# Patient Record
Sex: MALE | Race: BLACK OR AFRICAN AMERICAN | NOT HISPANIC OR LATINO | Employment: UNEMPLOYED | URBAN - METROPOLITAN AREA
[De-identification: names, ages, dates, MRNs, and addresses within clinical notes are randomized per-mention and may not be internally consistent; named-entity substitution may affect disease eponyms.]

---

## 2023-04-27 ENCOUNTER — HOSPITAL ENCOUNTER (OUTPATIENT)
Dept: DATA CONVERSION | Facility: HOSPITAL | Age: 18
End: 2023-04-27
Attending: STUDENT IN AN ORGANIZED HEALTH CARE EDUCATION/TRAINING PROGRAM | Admitting: STUDENT IN AN ORGANIZED HEALTH CARE EDUCATION/TRAINING PROGRAM
Payer: COMMERCIAL

## 2023-04-27 DIAGNOSIS — M92.8 OTHER SPECIFIED JUVENILE OSTEOCHONDROSIS: ICD-10-CM

## 2023-04-27 DIAGNOSIS — S86.819A STRAIN OF OTHER MUSCLE(S) AND TENDON(S) AT LOWER LEG LEVEL, UNSPECIFIED LEG, INITIAL ENCOUNTER: ICD-10-CM

## 2023-04-27 DIAGNOSIS — M92.522 JUVENILE OSTEOCHONDROSIS OF TIBIA TUBERCLE, LEFT LEG: ICD-10-CM

## 2023-04-27 DIAGNOSIS — M76.52 PATELLAR TENDINITIS, LEFT KNEE: ICD-10-CM

## 2023-09-07 VITALS
HEART RATE: 65 BPM | SYSTOLIC BLOOD PRESSURE: 130 MMHG | RESPIRATION RATE: 16 BRPM | DIASTOLIC BLOOD PRESSURE: 86 MMHG | TEMPERATURE: 97.9 F

## 2023-09-14 NOTE — H&P
History of Present Illness:   History Present Illness:  Reason for surgery: patella tendon tear   HPI:    Rayo presents for elective left knee patella tendon debridement and repair. No changes since last visit    Allergies:        Allergies:  ·  No Known Allergies :     Home Medication Review:   Home Medications Reviewed: yes     Impression/Procedure:   ·  Impression and Planned Procedure: PT debridement/repair       ERAS (Enhanced Recovery After Surgery):  ·  ERAS Patient: no       Vital Signs:  Temperature C: 36.6 degrees C   Temperature F: 97.8 degrees F   Heart Rate: 65 beats per minute   Respiratory Rate: 16 breath per minute   Blood Pressure Systolic: 130 mm/Hg   Blood Pressure Diastolic: 86 mm/Hg     Physical Exam by System:    Constitutional: Well developed, awake/alert/oriented  x3, no distress, alert and cooperative   Respiratory/Thorax: Patent airways, CTAB, normal  breath sounds with good chest expansion, thorax symmetric   Cardiovascular: Regular, rate and rhythm, no murmurs,  2+ equal pulses of the extremities, normal S 1and S 2   Musculoskeletal: left knee: ttp over tibia tubercle,  + bump, good SLR     Consent:   COVID-19 Consent:  ·  COVID-19 Risk Consent Surgeon has reviewed key risks related to the risk of amandeep COVID-19 and if they contract COVID-19 what the risks are.       Electronic Signatures:  Anand Aelxis)  (Signed 27-Apr-2023 08:12)   Authored: History of Present Illness, Allergies, Home  Medication Review, Impression/Procedure, ERAS, Physical Exam, Consent, Note Completion      Last Updated: 27-Apr-2023 08:12 by Anand Alexis)

## 2023-10-02 NOTE — OP NOTE
Post Operative Note:     PreOp Diagnosis: left knee patella tendinosis, osgood  schlatter disease   Post-Procedure Diagnosis: same   Procedure: left knee patella tendon debridement and  repair   Surgeon: Reuben   Resident/Fellow/Other Assistant: none   Anesthesia: glma + regional   Estimated Blood Loss (mL): none   Specimen: no   Complications: none   Findings: see dictation   Patient Returned To/Condition: pacu/stable   Tourniquet Times: 34 minutes     Attestation:   Note Completion:  Attending Attestation I was present for the entire procedure         Electronic Signatures:  Anand Alexis)  (Signed 27-Apr-2023 09:16)   Authored: Post Operative Note, Note Completion      Last Updated: 27-Apr-2023 09:16 by Anand Alexis)

## 2023-11-10 ENCOUNTER — HOSPITAL ENCOUNTER (EMERGENCY)
Facility: HOSPITAL | Age: 18
Discharge: HOME | End: 2023-11-10
Attending: EMERGENCY MEDICINE
Payer: COMMERCIAL

## 2023-11-10 ENCOUNTER — APPOINTMENT (OUTPATIENT)
Dept: RADIOLOGY | Facility: HOSPITAL | Age: 18
End: 2023-11-10
Payer: COMMERCIAL

## 2023-11-10 VITALS
RESPIRATION RATE: 18 BRPM | DIASTOLIC BLOOD PRESSURE: 83 MMHG | SYSTOLIC BLOOD PRESSURE: 125 MMHG | BODY MASS INDEX: 23.49 KG/M2 | HEIGHT: 68 IN | TEMPERATURE: 97.9 F | HEART RATE: 71 BPM | WEIGHT: 155 LBS | OXYGEN SATURATION: 100 %

## 2023-11-10 DIAGNOSIS — S46.911A STRAIN OF RIGHT SHOULDER, INITIAL ENCOUNTER: Primary | ICD-10-CM

## 2023-11-10 PROCEDURE — 99283 EMERGENCY DEPT VISIT LOW MDM: CPT | Mod: 25

## 2023-11-10 PROCEDURE — 73030 X-RAY EXAM OF SHOULDER: CPT | Mod: RT

## 2023-11-10 PROCEDURE — 99285 EMERGENCY DEPT VISIT HI MDM: CPT | Mod: 25 | Performed by: EMERGENCY MEDICINE

## 2023-11-10 PROCEDURE — 94760 N-INVAS EAR/PLS OXIMETRY 1: CPT

## 2023-11-10 ASSESSMENT — PAIN DESCRIPTION - LOCATION: LOCATION: SHOULDER

## 2023-11-10 ASSESSMENT — PAIN DESCRIPTION - FREQUENCY: FREQUENCY: CONSTANT/CONTINUOUS

## 2023-11-10 ASSESSMENT — PAIN SCALES - GENERAL: PAINLEVEL_OUTOF10: 5 - MODERATE PAIN

## 2023-11-10 ASSESSMENT — COLUMBIA-SUICIDE SEVERITY RATING SCALE - C-SSRS
6. HAVE YOU EVER DONE ANYTHING, STARTED TO DO ANYTHING, OR PREPARED TO DO ANYTHING TO END YOUR LIFE?: NO
1. IN THE PAST MONTH, HAVE YOU WISHED YOU WERE DEAD OR WISHED YOU COULD GO TO SLEEP AND NOT WAKE UP?: NO
2. HAVE YOU ACTUALLY HAD ANY THOUGHTS OF KILLING YOURSELF?: NO

## 2023-11-10 ASSESSMENT — PAIN DESCRIPTION - PAIN TYPE: TYPE: ACUTE PAIN

## 2023-11-10 ASSESSMENT — PAIN DESCRIPTION - PROGRESSION: CLINICAL_PROGRESSION: NOT CHANGED

## 2023-11-10 ASSESSMENT — PAIN DESCRIPTION - DESCRIPTORS: DESCRIPTORS: ACHING;PRESSURE

## 2023-11-10 ASSESSMENT — PAIN DESCRIPTION - ORIENTATION: ORIENTATION: RIGHT

## 2023-11-10 ASSESSMENT — PAIN - FUNCTIONAL ASSESSMENT: PAIN_FUNCTIONAL_ASSESSMENT: 0-10

## 2023-11-10 ASSESSMENT — PAIN DESCRIPTION - ONSET: ONSET: GRADUAL
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
## 2023-11-10 NOTE — Clinical Note
Rayo Espino was seen and treated in our emergency department on 11/10/2023.  He may return to work on 11/13/2023.  ?     If you have any questions or concerns, please don't hesitate to call.      Kelvin Nelson MD

## 2024-01-17 ENCOUNTER — OFFICE VISIT (OUTPATIENT)
Dept: ORTHOPEDIC SURGERY | Facility: HOSPITAL | Age: 19
End: 2024-01-17
Payer: COMMERCIAL

## 2024-01-17 DIAGNOSIS — M25.311 SHOULDER INSTABILITY, RIGHT: Primary | ICD-10-CM

## 2024-01-17 PROCEDURE — 99213 OFFICE O/P EST LOW 20 MIN: CPT | Performed by: ORTHOPAEDIC SURGERY

## 2024-01-17 NOTE — PROGRESS NOTES
Patient is here for reevaluation of his shoulder he is approximately 1 year status post anterior stabilization with Bankart procedure he has been doing generally well until about 2 months ago started to have some recurrent apprehension symptoms.  He does not feel that he can block the ball when playing basketball and certain motions at work are bothering his shoulder.  He does not have specific pain.    The patient is pleasant and cooperative.  The patient is alert and oriented ×3.  Auditory function is intact.  The patient is a good historian.  The patient is not in acute distress.  Eye exam significant for nonicteric sclera, intact ocular muscle movement.  Breathing is rhythmic symmetric and nonlabored.  Radial pulse palpable brisk capillary refill active sensation intact well-healed arthroscopy scars of the anterior and posterior shoulder.  Patient is full range of motion of the shoulder elevation 180+ external rotation abduction 100 degrees with no apprehension.  Motor power abduction internal/external rotation  strength 5/5.    Right shoulder instability    Patient has subtle instability symptoms I recommended revisiting formal physical therapy and repeat examination in 1 month.  Physical therapy prescription was provided today.    This was dictated using voice recognition software and not corrected for grammatical or spelling errors.

## 2024-01-23 ENCOUNTER — EVALUATION (OUTPATIENT)
Dept: PHYSICAL THERAPY | Facility: CLINIC | Age: 19
End: 2024-01-23
Payer: COMMERCIAL

## 2024-01-23 DIAGNOSIS — M25.511 RIGHT SHOULDER PAIN: Primary | ICD-10-CM

## 2024-01-23 DIAGNOSIS — M25.311 SHOULDER INSTABILITY, RIGHT: ICD-10-CM

## 2024-01-23 PROCEDURE — 97161 PT EVAL LOW COMPLEX 20 MIN: CPT | Mod: GP | Performed by: PHYSICAL THERAPIST

## 2024-01-23 ASSESSMENT — ENCOUNTER SYMPTOMS
OCCASIONAL FEELINGS OF UNSTEADINESS: 0
LOSS OF SENSATION IN FEET: 0
DEPRESSION: 0
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
## 2024-01-29 ENCOUNTER — TREATMENT (OUTPATIENT)
Dept: PHYSICAL THERAPY | Facility: CLINIC | Age: 19
End: 2024-01-29
Payer: COMMERCIAL

## 2024-01-29 DIAGNOSIS — M25.511 RIGHT SHOULDER PAIN: ICD-10-CM

## 2024-01-29 PROCEDURE — 97110 THERAPEUTIC EXERCISES: CPT | Mod: GP | Performed by: PHYSICAL THERAPIST

## 2024-01-29 NOTE — PROGRESS NOTES
Physical Therapy    Physical Therapy Treatment    Patient Name: Rayo Espino  MRN: [id number]  Today's Date: 1/29/2024    Time Calculation  Start Time: 1144  Stop Time: 1227  Time Calculation (min): 43 min    Visit #: 2 out of 13 (Eval + 12 visits authorized 1/24/24-3/22/24)  Evaluation date: 1/23/24    Current Problem:   1. Right shoulder pain  Follow Up In Physical Therapy          SUBJECTIVE:   Both shoulders feel sore from working out, but no significant change in symptoms in the R shoulder. Reports compliance with his HEP. Officially started job working night shift at [employer] without concern for physical requirements of job demands.      Precautions:  Past surgical considerations:   R shoulder anterior stabilization with Bankart repair (Dr. Hardin 7/2022)   L patellar tendon debridement and repair (Dr. Alexis 4/2023)        Pain:   Start of session: denies pain at rest, general soreness in B shoulders       OBJECTIVE:    Treatments:  Therapeutic Exercise: (43 minutes)  Seated UBE x2 min forward, x2 min backward  Unilateral mid cable row in staggered stance R UE 48# x15, 60# x15  Seated unilateral high cable row R UE48# x15, 60    Wall clocks with orange TB around wrists - horizontal ABD and scaption 2x10 cycles ea UE  Foam roll up wall in scapular protraction with orange TB around wrists 2x10  Standing R shoulder row at 90 ABD + ER isometric turns (orange TB) x10  Standing R shoulder ER (orange TB) + scaption raise x10  Prone B shoulder scapular retraction + extension x10  Prone R shoulder horizontal ABD x10    HEP:  Access Code: [email or web address]  URL: [email or web address]  Date: 01/23/2024  Prepared by: Carolina Monterroso     Exercises  - Seated Thoracic Lumbar Extension with Pectoralis Stretch  - 1 x daily - 7 x weekly - 10 reps  - Prone Shoulder Extension - Single Arm with Dumbbell  - 1 x daily - 7 x weekly - 2-3 sets - 10-15 reps  - Prone Single Arm Shoulder Horizontal Abduction with Dumbbell  - 1 x daily -  7 x weekly - 2-3 sets - 10-15 reps  - Prone Shoulder Row with External Rotation with Dumbbell  - 1 x daily - 7 x weekly - 2-3 sets - 10-15 reps  - Bent Over Single Arm Shoulder Row with Dumbbell  - 1 x daily - 7 x weekly - 2-3 sets - 10-15 reps    ASSESSMENT:   Pt participated in therapeutic exercise this date with resultant muscular fatigue of scapular stabilizers without any provocation of pain. Demonstrated appropriate performance of HEP interventions without need for technique correction.     Post session pain: muscular fatigue, denies pain     PLAN:  Continue with POC established at evaluation as below:    OP PT PLAN:  Treatment/Interventions: Blood Flow Restriction Therapy  , Dry Needling  , Education/Instruction , Neuromuscular re-education , Self care/home management , Taping techniques , Therapeutic activities , and Therapeutic exercise    PT Plan: Skilled PT   PT Frequency: 2 times per week   Duration: 6 weeks  Certification Period Start Date: 1/24/24  Certification Period End Date: 3/22/24  Visits Approved: Eval + 12  CPT codes approved: 65076, 24615, 47290, 62354   Rehab Potential: Good  Plan of Care Agreement: Patient         Goals:   SHORT TERM GOALS   1) Pt will decrease pain from 0-7/10 to 0-3/10 for improved activity tolerance -PROGRESSING  2) Pt will demonstrate improved postural awareness demonstrating neutral spinal and shoulder posture in sitting without need for cues -PROGRESSING  2) Pt will present with (-) R shoulder apprehension testing reflect improved anterior stability and reduced risk for subluxation -PROGRESSING     LTG  1) Pt will demonstrate independence with HEP for self management of condition at discharge -PROGRESSING  2) Pt will improve R shoulder elevation AROM to painfree and full (180 ABD, 180 FLEX) facilitate ADL performance without limitation -PROGRESSING  3) Pt will improve gross R shoulder/scapular strength to 5/5 with painfree MMT to facilitate ability to lift/carry  without limitation as required for job -PROGRESSING  4) Pt will improve total SPADI score from 39/130 to <5/130 to reflect improved UE function -PROGRESSING

## 2024-02-02 ENCOUNTER — TREATMENT (OUTPATIENT)
Dept: PHYSICAL THERAPY | Facility: CLINIC | Age: 19
End: 2024-02-02
Payer: COMMERCIAL

## 2024-02-02 DIAGNOSIS — M25.511 RIGHT SHOULDER PAIN: ICD-10-CM

## 2024-02-02 PROCEDURE — 97110 THERAPEUTIC EXERCISES: CPT | Mod: GP

## 2024-02-02 NOTE — PROGRESS NOTES
Physical Therapy    Physical Therapy Treatment    Patient Name: Rayo Espino  MRN: [id number]  Today's Date: 2/2/2024    Time Calculation  Start Time: 1108  Stop Time: 1148  Time Calculation (min): 40 min    Visit #: 3 out of 13 (Eval + 12 visits authorized 1/24/24-3/22/24)  Evaluation date: 1/23/24    Current Problem:   1. Right shoulder pain  Follow Up In Physical Therapy          SUBJECTIVE:   Patient arrived late to PT session. Patient reports that he just came off of a 8 hours night shift, denies pain but reports it was being used a lot. Can feel a click when lifting something heavy.      Precautions:  Past surgical considerations:   R shoulder anterior stabilization with Bankart repair (Dr. Hardin 7/2022)   L patellar tendon debridement and repair (Dr. Alexis 4/2023)        Pain:   Start of session: 3/10       OBJECTIVE:    Treatments:  Therapeutic Exercise: (40 minutes)  Seated UBE x2 min forward, x2 min backward  Prone B shoulder scapular retraction + extension 2x10  Prone R shoulder horizontal ABD 2x10   Prone R shoulder scaption 2x10   R UE cable row 60# 2x10 in staggered stance 2x10  R shoulder ball stabilization circles at wall CW/CCW 2x20   DA shoulder lift off at wall 2x10    HEP:  Access Code: [email or web address]  URL: [email or web address]  Date: 01/23/2024  Prepared by: Carolina Monterroso     Exercises  - Seated Thoracic Lumbar Extension with Pectoralis Stretch  - 1 x daily - 7 x weekly - 10 reps  - Prone Shoulder Extension - Single Arm with Dumbbell  - 1 x daily - 7 x weekly - 2-3 sets - 10-15 reps  - Prone Single Arm Shoulder Horizontal Abduction with Dumbbell  - 1 x daily - 7 x weekly - 2-3 sets - 10-15 reps  - Prone Shoulder Row with External Rotation with Dumbbell  - 1 x daily - 7 x weekly - 2-3 sets - 10-15 reps  - Bent Over Single Arm Shoulder Row with Dumbbell  - 1 x daily - 7 x weekly - 2-3 sets - 10-15 reps    ASSESSMENT:   Patient tolerated scapular strengthening/stability interventions to  improve function and promote progression towards goals. Patient did not report increase in pain with interventions at this date.     Post session pain: muscular fatigue, denies pain     PLAN:  Continue with POC established at evaluation as below:  Cvnv    OP PT PLAN:  Treatment/Interventions: Blood Flow Restriction Therapy  , Dry Needling  , Education/Instruction , Neuromuscular re-education , Self care/home management , Taping techniques , Therapeutic activities , and Therapeutic exercise    PT Plan: Skilled PT   PT Frequency: 2 times per week   Duration: 6 weeks  Certification Period Start Date: 1/24/24  Certification Period End Date: 3/22/24  Visits Approved: Eval + 12  CPT codes approved: 33036, 50198, 66525, 47954   Rehab Potential: Good  Plan of Care Agreement: Patient         Goals:   SHORT TERM GOALS   1) Pt will decrease pain from 0-7/10 to 0-3/10 for improved activity tolerance -PROGRESSING  2) Pt will demonstrate improved postural awareness demonstrating neutral spinal and shoulder posture in sitting without need for cues -PROGRESSING  2) Pt will present with (-) R shoulder apprehension testing reflect improved anterior stability and reduced risk for subluxation -PROGRESSING     LTG  1) Pt will demonstrate independence with HEP for self management of condition at discharge -PROGRESSING  2) Pt will improve R shoulder elevation AROM to painfree and full (180 ABD, 180 FLEX) facilitate ADL performance without limitation -PROGRESSING  3) Pt will improve gross R shoulder/scapular strength to 5/5 with painfree MMT to facilitate ability to lift/carry without limitation as required for job -PROGRESSING  4) Pt will improve total SPADI score from 39/130 to <5/130 to reflect improved UE function -PROGRESSING

## 2024-02-08 ENCOUNTER — TREATMENT (OUTPATIENT)
Dept: PHYSICAL THERAPY | Facility: CLINIC | Age: 19
End: 2024-02-08
Payer: COMMERCIAL

## 2024-02-08 DIAGNOSIS — M25.511 RIGHT SHOULDER PAIN: ICD-10-CM

## 2024-02-08 PROCEDURE — 97110 THERAPEUTIC EXERCISES: CPT | Mod: GP,CQ

## 2024-02-08 NOTE — PROGRESS NOTES
Physical Therapy    Physical Therapy Treatment    Patient Name: Rayo Espino  MRN: [id number]  Today's Date: 2/8/2024    Time Calculation  Start Time: 0937  Stop Time: 1018  Time Calculation (min): 41 min    Visit #: 4 out of 13 (Eval + 12 visits authorized 1/24/24-3/22/24)  Evaluation date: 1/23/24    Current Problem:   1. Right shoulder pain  Follow Up In Physical Therapy          SUBJECTIVE:   Patient arrived late to PT session. Notes to have had another overnight shift.  He detects some lingering sense of instability with overhead function while at work, though he isn't necessarily avoiding anything.     Precautions:  Past surgical considerations:   R shoulder anterior stabilization with Bankart repair (Dr. Hardin 7/2022)   L patellar tendon debridement and repair (Dr. Alexis 4/2023)        Pain:   Start of session: 0/10       OBJECTIVE:    Treatments:  Therapeutic Exercise: (40 minutes)  Stand and seated row 72# x15 ea  Backhand ball roll up wall 2x15  Prone -DA ball raise 2x15  S/L : ER 2# 2x12  -horz abd 2# 2x15  -sagittal flex/ext 2# 2x12  Supine SA protract 11# 2x10  -DA mini horz abd DA 2x15        HEP:  Added- S/L ER (2/8/24)    Access Code: [email or web address]  URL: [email or web address]  Date: 01/23/2024  Prepared by: Carolina Monterroso     Exercises  - Seated Thoracic Lumbar Extension with Pectoralis Stretch  - 1 x daily - 7 x weekly - 10 reps  - Prone Shoulder Extension - Single Arm with Dumbbell  - 1 x daily - 7 x weekly - 2-3 sets - 10-15 reps  - Prone Single Arm Shoulder Horizontal Abduction with Dumbbell  - 1 x daily - 7 x weekly - 2-3 sets - 10-15 reps  - Prone Shoulder Row with External Rotation with Dumbbell  - 1 x daily - 7 x weekly - 2-3 sets - 10-15 reps  - Bent Over Single Arm Shoulder Row with Dumbbell  - 1 x daily - 7 x weekly - 2-3 sets - 10-15 reps    ASSESSMENT:   Pt notes for protocol to be challenging, while acknowledging the desire for further stability to be gained with pertinent  scapthoracic and GHJ rhythms.    Post session pain: muscular fatigue, denies pain     PLAN:  Continue with POC established at evaluation as below:  Cvnv    OP PT PLAN:  Treatment/Interventions: Blood Flow Restriction Therapy  , Dry Needling  , Education/Instruction , Neuromuscular re-education , Self care/home management , Taping techniques , Therapeutic activities , and Therapeutic exercise    PT Plan: Skilled PT   PT Frequency: 2 times per week   Duration: 6 weeks  Certification Period Start Date: 1/24/24  Certification Period End Date: 3/22/24  Visits Approved: Eval + 12  CPT codes approved: 06103, 20844, 67395, 51104   Rehab Potential: Good  Plan of Care Agreement: Patient         Goals:   SHORT TERM GOALS   1) Pt will decrease pain from 0-7/10 to 0-3/10 for improved activity tolerance -PROGRESSING  2) Pt will demonstrate improved postural awareness demonstrating neutral spinal and shoulder posture in sitting without need for cues -PROGRESSING  2) Pt will present with (-) R shoulder apprehension testing reflect improved anterior stability and reduced risk for subluxation -PROGRESSING     LTG  1) Pt will demonstrate independence with HEP for self management of condition at discharge -PROGRESSING  2) Pt will improve R shoulder elevation AROM to painfree and full (180 ABD, 180 FLEX) facilitate ADL performance without limitation -PROGRESSING  3) Pt will improve gross R shoulder/scapular strength to 5/5 with painfree MMT to facilitate ability to lift/carry without limitation as required for job -PROGRESSING  4) Pt will improve total SPADI score from 39/130 to <5/130 to reflect improved UE function -PROGRESSING

## 2024-02-12 ENCOUNTER — TREATMENT (OUTPATIENT)
Dept: PHYSICAL THERAPY | Facility: CLINIC | Age: 19
End: 2024-02-12
Payer: COMMERCIAL

## 2024-02-12 DIAGNOSIS — M25.511 RIGHT SHOULDER PAIN: ICD-10-CM

## 2024-02-12 PROCEDURE — 97110 THERAPEUTIC EXERCISES: CPT | Mod: GP | Performed by: PHYSICAL THERAPIST

## 2024-02-12 NOTE — PROGRESS NOTES
Physical Therapy    Physical Therapy Treatment    Patient Name: Rayo Espino  MRN: [id number]  Today's Date: 2/12/2024    Time Calculation  Start Time: 0933  Stop Time: 1015  Time Calculation (min): 42 min    Visit #: 5 out of 13 (Eval + 12 visits authorized 1/24/24-3/22/24)  Evaluation date: 1/23/24    Current Problem:   1. Right shoulder pain  Follow Up In Physical Therapy          SUBJECTIVE:   Patient reports to be feeling well this morning, denying any current or recent discomfort in the shoulder.      Precautions:  Past surgical considerations:   R shoulder anterior stabilization with Bankart repair (Dr. Hardin 7/2022)   L patellar tendon debridement and repair (Dr. Alexis 4/2023)        Pain:   Start of session: 0/10       OBJECTIVE:    Treatments:  Therapeutic Exercise: (42 minutes)  Seated UBE 2.5 min forward/2.5 min backward  S/L R shoulder ER to 90 degrees ABD 2# DB 2x15  Standing R shoulder scaption (lime green TB resisted horizontal ABD) x13, x10  Standing R shoulder ER (orange TB) to 90 ABD + body turn (to 90/90 position) 2x10  1/2 kneeling R unilateral high cable row 60# x15, 72# x15  Quadruped scapular protraction 2x15  Standing R shoulder stabilization 14 oz ball vs wall x20 up/down, horizontal ABD/ADD, CW/CCW    HEP:  Added- S/L ER (2/8/24)    Access Code: [email or web address]  URL: [email or web address]  Date: 01/23/2024  Prepared by: Carolina Monterroso     Exercises  - Seated Thoracic Lumbar Extension with Pectoralis Stretch  - 1 x daily - 7 x weekly - 10 reps  - Prone Shoulder Extension - Single Arm with Dumbbell  - 1 x daily - 7 x weekly - 2-3 sets - 10-15 reps  - Prone Single Arm Shoulder Horizontal Abduction with Dumbbell  - 1 x daily - 7 x weekly - 2-3 sets - 10-15 reps  - Prone Shoulder Row with External Rotation with Dumbbell  - 1 x daily - 7 x weekly - 2-3 sets - 10-15 reps  - Bent Over Single Arm Shoulder Row with Dumbbell  - 1 x daily - 7 x weekly - 2-3 sets - 10-15 reps    ASSESSMENT:   Pt  surprised by fatigue created with low resistance interventions, recognizing strength deficits in shoulder stabilizers. Tactile cues required to facilitate appropriate scapular motion with interventions this date and for maintenance of form upon becoming fatigued. Has denied any pain in recent day to day activities/work related function.     Post session pain: muscular fatigue, denies pain     PLAN:  Continue with POC established at evaluation as below. In anticipation of evaluating provider's upcoming maternity leave, pt's POC to be transfered from Carolina Monterroso, PT to Jia Mares PT.    OP PT PLAN:  Treatment/Interventions: Blood Flow Restriction Therapy  , Dry Needling  , Education/Instruction , Neuromuscular re-education , Self care/home management , Taping techniques , Therapeutic activities , and Therapeutic exercise    PT Plan: Skilled PT   PT Frequency: 2 times per week   Duration: 6 weeks  Certification Period Start Date: 1/24/24  Certification Period End Date: 3/22/24  Visits Approved: Eval + 12  CPT codes approved: 20572, 33933, 07978, 03090   Rehab Potential: Good  Plan of Care Agreement: Patient         Goals:   SHORT TERM GOALS   1) Pt will decrease pain from 0-7/10 to 0-3/10 for improved activity tolerance -PROGRESSING  2) Pt will demonstrate improved postural awareness demonstrating neutral spinal and shoulder posture in sitting without need for cues -PROGRESSING  2) Pt will present with (-) R shoulder apprehension testing reflect improved anterior stability and reduced risk for subluxation -PROGRESSING     LTG  1) Pt will demonstrate independence with HEP for self management of condition at discharge -PROGRESSING  2) Pt will improve R shoulder elevation AROM to painfree and full (180 ABD, 180 FLEX) facilitate ADL performance without limitation -PROGRESSING  3) Pt will improve gross R shoulder/scapular strength to 5/5 with painfree MMT to facilitate ability to lift/carry without limitation as  required for job -PROGRESSING  4) Pt will improve total SPADI score from 39/130 to <5/130 to reflect improved UE function -PROGRESSING

## 2024-02-21 ENCOUNTER — OFFICE VISIT (OUTPATIENT)
Dept: ORTHOPEDIC SURGERY | Facility: HOSPITAL | Age: 19
End: 2024-02-21
Payer: COMMERCIAL

## 2024-02-21 DIAGNOSIS — M25.311 INSTABILITY OF RIGHT SHOULDER JOINT: ICD-10-CM

## 2024-02-21 PROCEDURE — 99212 OFFICE O/P EST SF 10 MIN: CPT | Performed by: ORTHOPAEDIC SURGERY

## 2024-02-21 NOTE — PROGRESS NOTES
Patient is here for reevaluation of the right shoulder.  He has had instability surgery and has had recurrence of instability symptoms.  Despite therapy the patient has continued subtle instability symptoms particularly elevation.    Right shoulder exam significant for full range of motion external rotation abduction 100 degrees.  Positive apprehension positive relocation sign.    Right shoulder instability    Patient has recurrent instability after stabilization procedure.  I recommended consideration for possible revision surgery.  Prior to doing this I would like him to have an MRI arthrogram repeated to clarify pathology.  Follow-up after MRI.    This was dictated using voice recognition software and not corrected for grammatical or spelling errors.

## 2024-03-21 ENCOUNTER — HOSPITAL ENCOUNTER (OUTPATIENT)
Dept: RADIOLOGY | Facility: HOSPITAL | Age: 19
Discharge: HOME | End: 2024-03-21
Payer: COMMERCIAL

## 2024-03-21 DIAGNOSIS — M25.311 INSTABILITY OF RIGHT SHOULDER JOINT: ICD-10-CM

## 2024-03-21 PROCEDURE — 73222 MRI JOINT UPR EXTREM W/DYE: CPT | Mod: RT

## 2024-03-21 PROCEDURE — 2500000005 HC RX 250 GENERAL PHARMACY W/O HCPCS

## 2024-03-21 PROCEDURE — 73222 MRI JOINT UPR EXTREM W/DYE: CPT | Mod: RIGHT SIDE | Performed by: STUDENT IN AN ORGANIZED HEALTH CARE EDUCATION/TRAINING PROGRAM

## 2024-03-21 PROCEDURE — A9575 INJ GADOTERATE MEGLUMI 0.1ML: HCPCS | Performed by: ORTHOPAEDIC SURGERY

## 2024-03-21 PROCEDURE — 2550000001 HC RX 255 CONTRASTS: Performed by: ORTHOPAEDIC SURGERY

## 2024-03-21 PROCEDURE — 73040 CONTRAST X-RAY OF SHOULDER: CPT | Mod: RT

## 2024-03-21 RX ORDER — GADOTERATE MEGLUMINE 376.9 MG/ML
0.1 INJECTION INTRAVENOUS
Status: COMPLETED | OUTPATIENT
Start: 2024-03-21 | End: 2024-03-21

## 2024-03-21 RX ORDER — LIDOCAINE HYDROCHLORIDE 10 MG/ML
20 INJECTION, SOLUTION EPIDURAL; INFILTRATION; INTRACAUDAL; PERINEURAL ONCE
Status: DISCONTINUED | OUTPATIENT
Start: 2024-03-21 | End: 2024-03-22 | Stop reason: HOSPADM

## 2024-03-21 RX ORDER — LIDOCAINE HYDROCHLORIDE 10 MG/ML
INJECTION INFILTRATION; PERINEURAL
Status: COMPLETED
Start: 2024-03-21 | End: 2024-03-21

## 2024-03-21 RX ADMIN — IOHEXOL 10 ML: 240 INJECTION, SOLUTION INTRATHECAL; INTRAVASCULAR; INTRAVENOUS; ORAL at 10:12

## 2024-03-21 RX ADMIN — GADOTERATE MEGLUMINE 0.1 ML: 376.9 INJECTION INTRAVENOUS at 10:12

## 2024-03-21 RX ADMIN — LIDOCAINE HYDROCHLORIDE 10 ML: 10 INJECTION, SOLUTION INFILTRATION; PERINEURAL at 10:12

## 2024-03-21 NOTE — POST-PROCEDURE NOTE
Interventional Radiology Brief Postprocedure Note    Attending: Bhupendra Ragland MD    Assistant: Malathi Roman     Diagnosis: Right shoulder instability    Description of procedure: right shoulder MRI arthrogram     Anesthesia:  Local    Complications: None    Estimated Blood Loss: none    Medications (Filter: Administrations occurring from 1353 to 1353 on 03/21/24) As of 03/21/24 1353      None          No specimens collected      See detailed result report with images in PACS.    The patient tolerated the procedure well without incident or complication and is in stable condition.

## 2024-03-21 NOTE — POST-PROCEDURE NOTE
Interventional Radiology Brief Postprocedure Note    Attending: JERAD Conner-CNP    Assistant: Dr. Encarnacion    Diagnosis: Right shoulder instability    Description of procedure: right intraarticular shoulder injection under direct fluoroscopic guidance.   Approximately 7ml of a solution of 1% lidocaine, Omnipaque, and gadolinium was injected.     Anesthesia:  Local    Complications: None    Estimated Blood Loss: none    Medications (Filter: Administrations occurring from 0951 to 0951 on 03/21/24)      None          No specimens collected      See detailed result report with images in PACS.    The patient tolerated the procedure well without incident or complication and is in stable condition.

## 2024-03-21 NOTE — PRE-PROCEDURE NOTE
"Interventional Radiology Preprocedure Note    Indication for procedure: The encounter diagnosis was Instability of right shoulder joint.    Relevant review of systems:  +Right shoulder pain    Relevant Labs:   No results found for: \"CREATININE\", \"EGFR\", \"PTT\", \"INR\", \"PROTIME\", \"PREGTESTUR\"    Planned Sedation/Anesthesia: local    Airway assessment: normal    Directed physical examination:    A&Ox3  Breathing Unlabored  Ambulatory without assistive devices.     Plan right shoulder injection for MRI arthrogram today.     Benefits, risks and alternatives of procedure and planned sedation have been discussed with the patient and/or their representative. All questions answered and they agree to proceed.   "

## 2024-03-25 ENCOUNTER — OFFICE VISIT (OUTPATIENT)
Dept: ORTHOPEDIC SURGERY | Facility: HOSPITAL | Age: 19
End: 2024-03-25
Payer: COMMERCIAL

## 2024-03-25 DIAGNOSIS — M25.311 SHOULDER INSTABILITY, RIGHT: Primary | ICD-10-CM

## 2024-03-25 PROCEDURE — 99213 OFFICE O/P EST LOW 20 MIN: CPT | Performed by: ORTHOPAEDIC SURGERY

## 2024-03-25 NOTE — PROGRESS NOTES
The patient did review the MRI scan of the right shoulder.    Right shoulder with MRI scan was reviewed in detail it shows equivocal findings with some posterior labrum fissuring and anterior labrum detachment but this is in the area of the sublabral foramen.  Inferior to this area there is heterogeneous signal which may be associated with previous anchor placement or suture placement.  No obvious intra detachment.    Recurrent instability right shoulder    We had detailed discussion about treatment options and alternatives I have recommended the patient consider revision surgery the revision surgery would be right shoulder open revision Bankart with capsular shift.  The patient would like to schedule sometime in the future he is going to check on his finances and contact our office to make arrangements.    This was dictated using voice recognition software and not corrected for grammatical or spelling errors.

## 2024-05-06 NOTE — OP NOTE
PREOPERATIVE DIAGNOSIS:  1. 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  2. Osgood-Schlatter's disease.    POSTOPERATIVE DIAGNOSIS:  1. 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  2. Osgood-Schlatter's disease.    OPERATION/PROCEDURE:  Left knee open patellar tendon debridement, ossicle excision, and  patellar tendon repair.     SURGEON:  Anand Alexis MD.    ASSISTANT(S):    ANESTHESIA:  General plus regional.    COMPLICATIONS:  None.    INDICATIONS FOR PROCEDURE:  The patient is a 17-year-old male, who injured his knee playing  basketball roughly 2 years ago.  He has had ongoing knee pain over  the insertion of his patellar tendon.  An MRI was obtained which  demonstrated a sequela of Osgood-Schlatter's disease and a partial  tear of his patella tendon insertion.  I reviewed the potential for  the surgery including debridement of tendon repair.  We reviewed the  risks including, but not limited to, bleeding; infection; injury to  surrounding tissue, nerves, vessels, DVT/PE, re-rupture, persistent  pain, the potential need for future surgery.  He understood fully and  wished to proceed.     PROCEDURE IN DETAIL:  The patient was met in the preoperative holding area.  Left knee  identified as correct operative limb by myself, patient, as well as  attending anesthesiologist, was marked with an indelible marker.  Informed consent was in the chart.  He received a regional nerve  block by Anesthesia, taken back to the OR, placed supine on the  operative bed in stable condition.  At this point, a formal  interdisciplinary Huddle was carried out correctly identifying the  patient, the procedure, the correct operative limb, and all necessary  equipment and all were in agreement.  He was surrendered under  general anesthetic.  Airway was secured with an LMA tube.  Once he  was asleep, a nonsterile thigh tourniquet was placed and left leg was  prepped and draped in standard sterile fashion.   He received Ancef  for antibiotics.  A time-out was called.  Limb was exsanguinated,  tourniquet inflated.  We began with a 3 cm incision, centered over  the inferior patellar tendon, taken thyroid sharply down to  subcutaneous tissue.  Full-thickness skin flaps were developed.  The  paratenon was incised.  The patellar tendon was identified.  The  ossicle could be palpated on the tubercle in the central aspect of  the tendon and the tendon was then incised midline.  The ossicle was  identified.  It was skeletonized off the patellar tendon and then  removed.  There was considerable tendinosis in the midsubstance of  the patella tendon and any diseased tendon was then removed.  To  repair the tendon and to prevent further rupture, a double-loaded  Q-Fix anchor was placed in the tibia tubercle.  Each limb was then  whipstitched into the respective limbs of the patellar tendon using  Krackow sutures and were then tied down using the tension slide  technique.  The midline incision of the patellar tendon was then  closed with 0 Vicryl simple sutures.  The paratenon closed with 2-0  Vicryl and the subcuticular layer with 2-0 Vicryl and the skin with  3-0 Monocryl.  At the end of the case all sponge, needle, and  instrument counts correct.  Sterile dressing was placed followed by  thigh-high ALYX hose and a hinged knee brace locked in full extension.   Tourniquet was deflated at 34 minutes.  At the end the case, all  sponge, needle, and instrument counts correct.  He was then extubated  and taken to PACU without complications.     POSTOPERATIVE PLAN:  The patient will be discharged home.  He will utilize crutches and  the brace locked in extension.  We will see him in a week for a wound  check and initiate physical therapy.       Anand Alexis MD    DD:  04/27/2023 09:19:52 EST  DT:  04/27/2023 09:37:17 EST  DICTATION NUMBER:  [id number]  INTERNAL JOB NUMBER:  [id number]    CC:  Anand Alexis MD, Fax:  [phone]        Electronic Signatures:  Anand Alexis) (Signed on 28-Apr-2023 10:16)   Authored  Unsigned, Draft (SYS GENERATED) (Entered on 27-Apr-2023 09:37)   Entered    Last Updated: 28-Apr-2023 10:16 by Anand Alexis)

## 2024-05-17 ENCOUNTER — DOCUMENTATION (OUTPATIENT)
Dept: PHYSICAL THERAPY | Facility: CLINIC | Age: 19
End: 2024-05-17
Payer: COMMERCIAL

## 2024-05-17 NOTE — PROGRESS NOTES
Physical Therapy    Discharge Summary    Patient Name: Rayo Espino  : 2005  MRN: [id number]  Today's Date: 2024    Referring Provider: Micah Hardin MD   Medical Diagnosis: M25.311 (ICD-10-CM) - Shoulder instability, right   Therapy Diagnosis: Right shoulder pain    Discharge Summary: PT    Evaluation Date: 24  Visit total to date: 5  Date of Last Visit: 24    Therapy Summary: Patient made fair progress secondary to limited attendance to PT. Patient tolerated interventions to improve shoulder stability without increase in pain. Patient failed to return for future treatment sessions.    Rehab Discharge Reason: Attendance inconsistent and Failed to schedule and/or keep follow-up appointment(s)

## 2024-12-08 ENCOUNTER — HOSPITAL ENCOUNTER (EMERGENCY)
Facility: HOSPITAL | Age: 19
Discharge: HOME | End: 2024-12-08

## 2024-12-08 VITALS
RESPIRATION RATE: 18 BRPM | SYSTOLIC BLOOD PRESSURE: 125 MMHG | DIASTOLIC BLOOD PRESSURE: 72 MMHG | HEIGHT: 67 IN | OXYGEN SATURATION: 100 % | BODY MASS INDEX: 24.33 KG/M2 | HEART RATE: 68 BPM | WEIGHT: 155 LBS | TEMPERATURE: 98.6 F

## 2024-12-08 DIAGNOSIS — Z11.3 SCREENING EXAMINATION FOR STD (SEXUALLY TRANSMITTED DISEASE): Primary | ICD-10-CM

## 2024-12-08 LAB
APPEARANCE UR: CLEAR
BILIRUB UR STRIP.AUTO-MCNC: NEGATIVE MG/DL
CLUE CELLS SPEC QL WET PREP: NORMAL
COLOR UR: YELLOW
GLUCOSE UR STRIP.AUTO-MCNC: NORMAL MG/DL
KETONES UR STRIP.AUTO-MCNC: NEGATIVE MG/DL
LEUKOCYTE ESTERASE UR QL STRIP.AUTO: NEGATIVE
MUCOUS THREADS #/AREA URNS AUTO: NORMAL /LPF
NITRITE UR QL STRIP.AUTO: NEGATIVE
PH UR STRIP.AUTO: 7 [PH]
PROT UR STRIP.AUTO-MCNC: ABNORMAL MG/DL
RBC # UR STRIP.AUTO: NEGATIVE /UL
RBC #/AREA URNS AUTO: NORMAL /HPF
SP GR UR STRIP.AUTO: 1.04
T VAGINALIS SPEC QL WET PREP: NORMAL
UROBILINOGEN UR STRIP.AUTO-MCNC: ABNORMAL MG/DL
WBC #/AREA URNS AUTO: NORMAL /HPF
WBC VAG QL WET PREP: NORMAL
YEAST VAG QL WET PREP: NORMAL

## 2024-12-08 PROCEDURE — 87591 N.GONORRHOEAE DNA AMP PROB: CPT | Mod: WESLAB | Performed by: PHYSICIAN ASSISTANT

## 2024-12-08 PROCEDURE — 99283 EMERGENCY DEPT VISIT LOW MDM: CPT

## 2024-12-08 PROCEDURE — 87210 SMEAR WET MOUNT SALINE/INK: CPT | Performed by: PHYSICIAN ASSISTANT

## 2024-12-08 PROCEDURE — 81001 URINALYSIS AUTO W/SCOPE: CPT | Performed by: PHYSICIAN ASSISTANT

## 2024-12-08 ASSESSMENT — LIFESTYLE VARIABLES
HAVE PEOPLE ANNOYED YOU BY CRITICIZING YOUR DRINKING: NO
EVER FELT BAD OR GUILTY ABOUT YOUR DRINKING: NO
EVER HAD A DRINK FIRST THING IN THE MORNING TO STEADY YOUR NERVES TO GET RID OF A HANGOVER: NO
TOTAL SCORE: 0
HAVE YOU EVER FELT YOU SHOULD CUT DOWN ON YOUR DRINKING: NO

## 2024-12-08 ASSESSMENT — PAIN - FUNCTIONAL ASSESSMENT: PAIN_FUNCTIONAL_ASSESSMENT: 0-10

## 2024-12-08 ASSESSMENT — COLUMBIA-SUICIDE SEVERITY RATING SCALE - C-SSRS
2. HAVE YOU ACTUALLY HAD ANY THOUGHTS OF KILLING YOURSELF?: NO
6. HAVE YOU EVER DONE ANYTHING, STARTED TO DO ANYTHING, OR PREPARED TO DO ANYTHING TO END YOUR LIFE?: NO
1. IN THE PAST MONTH, HAVE YOU WISHED YOU WERE DEAD OR WISHED YOU COULD GO TO SLEEP AND NOT WAKE UP?: NO

## 2024-12-08 NOTE — ED TRIAGE NOTES
Patient arrived to the ED from home via personal vehicle with chief complaint of possible STD exposure. Patient had first episode of unprotected intercourse with a new woman last night and this morning she requested they both be tested. Patient denies symptoms.

## 2024-12-08 NOTE — ED PROVIDER NOTES
HPI   Chief Complaint   Patient presents with    Exposure to STD       HPI  This is a 19-year-old male presenting to the emergency department for STI testing.  Patient states that he is asymptomatic.  He denies any dysuria, hematuria, increased urgency or frequency.  No penile discharge.  He denies any genital lesions.  He states the female partner that he has recently had sexual encounters with wanted him to be tested for STIs.  He does not want to be empirically treated for any STIs today.  He simply wants testing only.  He has no abdominal pain, fever or chills.  No scrotal pain or swelling.      Patient History   No past medical history on file.  No past surgical history on file.  No family history on file.  Social History     Tobacco Use    Smoking status: Not on file    Smokeless tobacco: Not on file   Substance Use Topics    Alcohol use: Not on file    Drug use: Not on file       Physical Exam   ED Triage Vitals [12/08/24 1321]   Temperature Heart Rate Respirations BP   37 °C (98.6 °F) 68 18 125/72      Pulse Ox Temp Source Heart Rate Source Patient Position   100 % Temporal Monitor Sitting      BP Location FiO2 (%)     Left arm --       Physical Exam  GENERAL APPEARANCE: This patient is in no acute respiratory distress. Awake and alert, talking appropriately. Answering questions appropriately. No evidence of pressured speech  VITAL SIGNS: As per the nurses' triage record.  HEENT: Normocephalic, atraumatic.  NECK:  full gross ROM during exam  Genital exam deferred by patient.  MUSCULOSKELETAL:  Full gross active range of motion. Ambulating on own with no acute difficulties  NEUROLOGICAL: Awake, alert and oriented x 3.  IMMUNOLOGICAL: No palpable lymphadenopathy or lymphatic streaking noted on visible skin.  DERM: No petechiae, rashes, or ecchymoses on visible skin  PSYCH: mood and affect appear normal.      ED Course & MDM   Diagnoses as of 12/08/24 1330   Screening examination for STD (sexually transmitted  disease)                 No data recorded                                 Medical Decision Making  Parts of this chart have been completed using voice recognition software. Please excuse any errors of transcription.  My thought process and reason for plan has been formulated from the time that I saw the patient until the time of disposition and is not specific to one specific moment during their visit and furthermore my MDM encompasses this entire chart and not only this text box.      HPI: Detailed above.    Exam: A medically appropriate exam performed, outlined above, given the known history and presentation.    History obtained from: Patient    Medications given during visit:  Medications - No data to display     Diagnostic/tests  Labs Reviewed   C. TRACHOMATIS + N. GONORRHOEAE, AMPLIFIED - Abnormal       Result Value    Neisseria gonorrhea,Amplified Negative      Chlamydia trachomatis, Amplified Positive (*)     Narrative:     The APTIMA Combo 2 assay is FDA-approved NAAT using target capture for the in vitro qualitative detection and differentiation of ribosomal RNA (rRNA) for Chlamydia trachomatis and Neisseria gonorrhoeae testing on clinician-collected endocervical, PreservCyt solution liquid Pap specimens, vaginal, throat, rectal, and male urethral swab specimens; patient-collected vaginal swab specimens, and female and male urine specimens from symptomatic and asymptomatic individuals. Samples from all other sites are not validated for this method.   URINALYSIS WITH REFLEX CULTURE AND MICROSCOPIC - Abnormal    Color, Urine Yellow      Appearance, Urine Clear      Specific Gravity, Urine 1.037 (*)     pH, Urine 7.0      Protein, Urine 20 (TRACE)      Glucose, Urine Normal      Blood, Urine NEGATIVE      Ketones, Urine NEGATIVE      Bilirubin, Urine NEGATIVE      Urobilinogen, Urine 8 (3+) (*)     Nitrite, Urine NEGATIVE      Leukocyte Esterase, Urine NEGATIVE     WET PREP, GENITAL    Trichomonas None Seen       Clue Cells None Seen      Yeast None Seen      WBC NONE SEEN     URINALYSIS WITH REFLEX CULTURE AND MICROSCOPIC    Narrative:     The following orders were created for panel order Urinalysis with Reflex Culture and Microscopic.  Procedure                               Abnormality         Status                     ---------                               -----------         ------                     Urinalysis with Reflex C...[[id number]]  Abnormal            Final result               Extra Urine Gray Tube[[id number]]                            Final result                 Please view results for these tests on the individual orders.   EXTRA URINE GRAY TUBE    Extra Tube Hold for add-ons.     URINALYSIS WITH REFLEX CULTURE AND MICROSCOPIC    Narrative:     The following orders were created for panel order Urinalysis with Reflex Culture and Microscopic.  Procedure                               Abnormality         Status                     ---------                               -----------         ------                     Urinalysis with Reflex C...[[id number]]                                                 Extra Urine Gray Tube[[id number]]                                                         Please view results for these tests on the individual orders.   EXTRA URINE GRAY TUBE   URINALYSIS MICROSCOPIC WITH REFLEX CULTURE    WBC, Urine 1-5      RBC, Urine 3-5      Mucus, Urine FEW        No orders to display        Considerations/further MDM:    Patient presented to the emergency department to be tested for STI.  He is asymptomatic.  He is not want empiric treatment.  I educated patient that he will not have test results today and I am willing to empirically treat him for common STIs if he would like however he declined.  Urinalysis and wet prep was pending and patient left the emergency department and completion of treatment.  At time of patient leaving the emergency department gonorrhea and chlamydia testing  is also pending and patient was told upon initial evaluation that he will need to follow-up with his primary care doctor for these test results.  He was educated to avoid any type of sexual encounters until he knows all testing results and is treated as indicated.    Patient was seen on 12/8/2024, note signed on 12/12/2024.      Procedure  Procedures     Anjelica Hoang PA-C  12/12/24 1914

## 2024-12-08 NOTE — DISCHARGE INSTRUCTIONS
Today you were tested for gonorrhea, chlamydia and trichomonas which are the most common sexually transmitted infections.  I do recommend you follow-up with your primary care doctor for results of STI results.  I recommend no sexual activity until you know your STI results.  If one of your STI result is positive you need to ensure you follow-up with primary care doctor for treatment.

## 2024-12-09 LAB
C TRACH RRNA SPEC QL NAA+PROBE: POSITIVE
HOLD SPECIMEN: NORMAL
N GONORRHOEA DNA SPEC QL PROBE+SIG AMP: NEGATIVE